# Patient Record
Sex: MALE
[De-identification: names, ages, dates, MRNs, and addresses within clinical notes are randomized per-mention and may not be internally consistent; named-entity substitution may affect disease eponyms.]

---

## 2021-05-10 ENCOUNTER — APPOINTMENT (OUTPATIENT)
Dept: INTERNAL MEDICINE | Facility: CLINIC | Age: 41
End: 2021-05-10
Payer: OTHER GOVERNMENT

## 2021-05-10 ENCOUNTER — OUTPATIENT (OUTPATIENT)
Dept: OUTPATIENT SERVICES | Facility: HOSPITAL | Age: 41
LOS: 1 days | Discharge: HOME | End: 2021-05-10

## 2021-05-10 VITALS
WEIGHT: 140 LBS | BODY MASS INDEX: 17.97 KG/M2 | DIASTOLIC BLOOD PRESSURE: 85 MMHG | HEIGHT: 74 IN | TEMPERATURE: 98 F | SYSTOLIC BLOOD PRESSURE: 121 MMHG | OXYGEN SATURATION: 98 % | HEART RATE: 103 BPM

## 2021-05-10 DIAGNOSIS — Z00.00 ENCOUNTER FOR GENERAL ADULT MEDICAL EXAMINATION W/OUT ABNORMAL FINDINGS: ICD-10-CM

## 2021-05-10 DIAGNOSIS — Z87.798 PERSONAL HISTORY OF OTHER (CORRECTED) CONGENITAL MALFORMATIONS: ICD-10-CM

## 2021-05-10 DIAGNOSIS — Z80.1 FAMILY HISTORY OF MALIGNANT NEOPLASM OF TRACHEA, BRONCHUS AND LUNG: ICD-10-CM

## 2021-05-10 DIAGNOSIS — F10.21 ALCOHOL DEPENDENCE, IN REMISSION: ICD-10-CM

## 2021-05-10 DIAGNOSIS — Z86.59 PERSONAL HISTORY OF OTHER MENTAL AND BEHAVIORAL DISORDERS: ICD-10-CM

## 2021-05-10 PROCEDURE — 99203 OFFICE O/P NEW LOW 30 MIN: CPT | Mod: GC

## 2021-05-10 NOTE — HISTORY OF PRESENT ILLNESS
[FreeTextEntry1] : Initial Evaluation [de-identified] : 40M with PMH of anxiety and alcoholism presenting to [facility] care. Pt mentions has had severe anxiety/depression over the last few weeks that has prevented him from performing simple daily tasks.  He was diagnosed with anxiety "years ago" and was previously seeing a Psychiatrist who prescribed Citalopram 15mg daily and Klonopin 1mg BID which used to help, but he has not taken these medications since 3/6 when he ran out. He also mentions struggles with alcoholism, citing a last drink on 1/292021. Prefers to drinks hard alcohol (vodka, tequilla, rum). He mentions a history of black outs as well as disputes with family members over his drinking. He attributes his drinking habits to depression, and endorses anhedonia, decreased sleeping, and irritation. He denies suicidal or homicidal ideation. He also mentions recent smoking cessation. He previously smoked for 27 years (since 12yo), over 1 pack/day. Quit 1 month ago.\par \par He recently moved from [place] to [place] on 4/17 and has been living with family members. He is currently trying to apply for unemployment but has been having difficulty completing it secondary to his anxiety and depression.\par \par Congenital right arm abnormality\par \par Hernia repair over 20 yrs ago

## 2021-05-10 NOTE — ASSESSMENT
[FreeTextEntry1] : 39yo M with PMH of anxiety/depression and alcoholism presenting for establishment of care and severe anxiety. \par \par \par #Anxiety/Depression\par - Endorses severe anxiety, unable to complete daily tasks\par - Previously prescribed Citalopram and Klonopin by Psychiatrist\par - Will refer for Psych consultation. Advised patient to present to [facility] ED for psych evaluation as they may be able to facilitate quicker appointment with a Psychiatrist.\par - Denies HI/SI\par \par #Alcoholism\par - Last drink 1/29/21\par - Will check B12 and folate\par \par #HCM\par - Routine labs ordered\par - Psych referral\par - Rtc in 1 month for lab results

## 2021-05-10 NOTE — PHYSICAL EXAM
[No Acute Distress] : no acute distress [Well Nourished] : well nourished [Well Developed] : well developed [Well-Appearing] : well-appearing [Normal Sclera/Conjunctiva] : normal sclera/conjunctiva [PERRL] : pupils equal round and reactive to light [EOMI] : extraocular movements intact [Normal Outer Ear/Nose] : the outer ears and nose were normal in appearance [Normal Oropharynx] : the oropharynx was normal [No JVD] : no jugular venous distention [No Lymphadenopathy] : no lymphadenopathy [Supple] : supple [Thyroid Normal, No Nodules] : the thyroid was normal and there were no nodules present [No Respiratory Distress] : no respiratory distress  [No Accessory Muscle Use] : no accessory muscle use [Clear to Auscultation] : lungs were clear to auscultation bilaterally [Normal Rate] : normal rate  [Regular Rhythm] : with a regular rhythm [Normal S1, S2] : normal S1 and S2 [No Murmur] : no murmur heard [No Carotid Bruits] : no carotid bruits [No Abdominal Bruit] : a ~M bruit was not heard ~T in the abdomen [No Varicosities] : no varicosities [Pedal Pulses Present] : the pedal pulses are present [No Edema] : there was no peripheral edema [No Palpable Aorta] : no palpable aorta [No Extremity Clubbing/Cyanosis] : no extremity clubbing/cyanosis [Soft] : abdomen soft [Non Tender] : non-tender [Non-distended] : non-distended [No Masses] : no abdominal mass palpated [No HSM] : no HSM [Normal Bowel Sounds] : normal bowel sounds [Normal Posterior Cervical Nodes] : no posterior cervical lymphadenopathy [Normal Anterior Cervical Nodes] : no anterior cervical lymphadenopathy [No CVA Tenderness] : no CVA  tenderness [No Spinal Tenderness] : no spinal tenderness [No Joint Swelling] : no joint swelling [Grossly Normal Strength/Tone] : grossly normal strength/tone [No Rash] : no rash [Coordination Grossly Intact] : coordination grossly intact [No Focal Deficits] : no focal deficits [Alert and Oriented x3] : oriented to person, place, and time [de-identified] : congenital right arm aplasia [de-identified] : Depressed mood

## 2021-05-10 NOTE — REVIEW OF SYSTEMS
[Joint Pain] : joint pain [Back Pain] : back pain [Memory Loss] : memory loss [Anxiety] : anxiety [Depression] : depression [Negative] : Heme/Lymph [Suicidal] : not suicidal [Insomnia] : no insomnia

## 2021-05-10 NOTE — HEALTH RISK ASSESSMENT
[Good] : ~his/her~ current health as good [Poor] : ~his/her~ mood as  poor [] : Yes [21-25] : 21-25 [Yes] : Yes [4 or more  times a week (4 pts)] : 4 or more  times a week (4 points) [3] : 1) Little interest or pleasure doing things for nearly every day (3) [2] : 2) Feeling down, depressed, or hopeless for more than half of the days (2) [FreeTextEntry1] : Anxiety [YearQuit] : 2021 [de-identified] : Last drink 1/29/21

## 2021-05-12 DIAGNOSIS — Z00.00 ENCOUNTER FOR GENERAL ADULT MEDICAL EXAMINATION WITHOUT ABNORMAL FINDINGS: ICD-10-CM

## 2021-05-12 DIAGNOSIS — Z86.59 PERSONAL HISTORY OF OTHER MENTAL AND BEHAVIORAL DISORDERS: ICD-10-CM

## 2021-06-14 ENCOUNTER — APPOINTMENT (OUTPATIENT)
Dept: INTERNAL MEDICINE | Facility: CLINIC | Age: 41
End: 2021-06-14